# Patient Record
Sex: MALE | Race: BLACK OR AFRICAN AMERICAN | NOT HISPANIC OR LATINO | Employment: UNEMPLOYED | URBAN - METROPOLITAN AREA
[De-identification: names, ages, dates, MRNs, and addresses within clinical notes are randomized per-mention and may not be internally consistent; named-entity substitution may affect disease eponyms.]

---

## 2023-04-18 ENCOUNTER — APPOINTMENT (OUTPATIENT)
Dept: RADIOLOGY | Facility: MEDICAL CENTER | Age: 48
End: 2023-04-18
Attending: EMERGENCY MEDICINE
Payer: OTHER GOVERNMENT

## 2023-04-18 ENCOUNTER — HOSPITAL ENCOUNTER (EMERGENCY)
Facility: MEDICAL CENTER | Age: 48
End: 2023-04-18
Attending: EMERGENCY MEDICINE
Payer: OTHER GOVERNMENT

## 2023-04-18 VITALS
WEIGHT: 202.6 LBS | BODY MASS INDEX: 26 KG/M2 | TEMPERATURE: 98.6 F | HEART RATE: 61 BPM | HEIGHT: 74 IN | RESPIRATION RATE: 16 BRPM | SYSTOLIC BLOOD PRESSURE: 148 MMHG | DIASTOLIC BLOOD PRESSURE: 87 MMHG | OXYGEN SATURATION: 95 %

## 2023-04-18 DIAGNOSIS — R42 DIZZINESS: ICD-10-CM

## 2023-04-18 DIAGNOSIS — I10 HYPERTENSION, UNSPECIFIED TYPE: ICD-10-CM

## 2023-04-18 LAB
ALBUMIN SERPL BCP-MCNC: 4.2 G/DL (ref 3.2–4.9)
ALBUMIN/GLOB SERPL: 1.3 G/DL
ALP SERPL-CCNC: 86 U/L (ref 30–99)
ALT SERPL-CCNC: 26 U/L (ref 2–50)
ANION GAP SERPL CALC-SCNC: 12 MMOL/L (ref 7–16)
AST SERPL-CCNC: 58 U/L (ref 12–45)
BASOPHILS # BLD AUTO: 1.3 % (ref 0–1.8)
BASOPHILS # BLD: 0.05 K/UL (ref 0–0.12)
BILIRUB SERPL-MCNC: 0.6 MG/DL (ref 0.1–1.5)
BUN SERPL-MCNC: 14 MG/DL (ref 8–22)
CALCIUM ALBUM COR SERPL-MCNC: 8.8 MG/DL (ref 8.5–10.5)
CALCIUM SERPL-MCNC: 9 MG/DL (ref 8.5–10.5)
CHLORIDE SERPL-SCNC: 105 MMOL/L (ref 96–112)
CO2 SERPL-SCNC: 23 MMOL/L (ref 20–33)
CREAT SERPL-MCNC: 1.01 MG/DL (ref 0.5–1.4)
EKG IMPRESSION: NORMAL
EKG IMPRESSION: NORMAL
EOSINOPHIL # BLD AUTO: 0.16 K/UL (ref 0–0.51)
EOSINOPHIL NFR BLD: 4.3 % (ref 0–6.9)
ERYTHROCYTE [DISTWIDTH] IN BLOOD BY AUTOMATED COUNT: 40.2 FL (ref 35.9–50)
GFR SERPLBLD CREATININE-BSD FMLA CKD-EPI: 93 ML/MIN/1.73 M 2
GLOBULIN SER CALC-MCNC: 3.2 G/DL (ref 1.9–3.5)
GLUCOSE SERPL-MCNC: 105 MG/DL (ref 65–99)
HCT VFR BLD AUTO: 41 % (ref 42–52)
HGB BLD-MCNC: 13.8 G/DL (ref 14–18)
IMM GRANULOCYTES # BLD AUTO: 0 K/UL (ref 0–0.11)
IMM GRANULOCYTES NFR BLD AUTO: 0 % (ref 0–0.9)
LYMPHOCYTES # BLD AUTO: 1.51 K/UL (ref 1–4.8)
LYMPHOCYTES NFR BLD: 40.6 % (ref 22–41)
MCH RBC QN AUTO: 29.6 PG (ref 27–33)
MCHC RBC AUTO-ENTMCNC: 33.7 G/DL (ref 33.7–35.3)
MCV RBC AUTO: 88 FL (ref 81.4–97.8)
MONOCYTES # BLD AUTO: 0.42 K/UL (ref 0–0.85)
MONOCYTES NFR BLD AUTO: 11.3 % (ref 0–13.4)
NEUTROPHILS # BLD AUTO: 1.58 K/UL (ref 1.82–7.42)
NEUTROPHILS NFR BLD: 42.5 % (ref 44–72)
NRBC # BLD AUTO: 0 K/UL
NRBC BLD-RTO: 0 /100 WBC
PLATELET # BLD AUTO: 233 K/UL (ref 164–446)
PMV BLD AUTO: 9.6 FL (ref 9–12.9)
POTASSIUM SERPL-SCNC: 3.4 MMOL/L (ref 3.6–5.5)
PROT SERPL-MCNC: 7.4 G/DL (ref 6–8.2)
RBC # BLD AUTO: 4.66 M/UL (ref 4.7–6.1)
SODIUM SERPL-SCNC: 140 MMOL/L (ref 135–145)
TROPONIN T SERPL-MCNC: 8 NG/L (ref 6–19)
WBC # BLD AUTO: 3.7 K/UL (ref 4.8–10.8)

## 2023-04-18 PROCEDURE — 84484 ASSAY OF TROPONIN QUANT: CPT

## 2023-04-18 PROCEDURE — 71045 X-RAY EXAM CHEST 1 VIEW: CPT

## 2023-04-18 PROCEDURE — 70450 CT HEAD/BRAIN W/O DYE: CPT

## 2023-04-18 PROCEDURE — 93005 ELECTROCARDIOGRAM TRACING: CPT | Performed by: EMERGENCY MEDICINE

## 2023-04-18 PROCEDURE — 36415 COLL VENOUS BLD VENIPUNCTURE: CPT

## 2023-04-18 PROCEDURE — 99284 EMERGENCY DEPT VISIT MOD MDM: CPT

## 2023-04-18 PROCEDURE — 80053 COMPREHEN METABOLIC PANEL: CPT

## 2023-04-18 PROCEDURE — 85025 COMPLETE CBC W/AUTO DIFF WBC: CPT

## 2023-04-18 RX ORDER — LISINOPRIL 10 MG/1
10 TABLET ORAL DAILY
Qty: 30 TABLET | Refills: 0 | Status: SHIPPED | OUTPATIENT
Start: 2023-04-18 | End: 2023-05-07 | Stop reason: SDUPTHER

## 2023-04-18 ASSESSMENT — PAIN DESCRIPTION - PAIN TYPE: TYPE: ACUTE PAIN

## 2023-04-18 NOTE — ED NOTES
Pt provided with discharge teaching and information was discussed. Pt questions answered. Pt verbalized understanding of importance of follow up with health care provider. Pt verbalized importance to  prescription medication from agreed pharmacy. Pt verbalized understanding of when to return if symptoms worsen. Pt ambulated out of the ED.

## 2023-04-18 NOTE — ED PROVIDER NOTES
ED Provider Note    CHIEF COMPLAINT  Chief Complaint   Patient presents with    Dizziness     Patient reports dizzy spells for months. Patient states he has wanted to get it checked out for a while. Hx of htn. Denies any current medication for htn.        EXTERNAL RECORDS REVIEWED  Inpatient Notes discharge summary from January 16, 2003 reviewed and Outpatient Notes cardiology follow-up October 31, 2013    HPI/ROS  LIMITATION TO HISTORY   Select: : None  OUTSIDE HISTORIAN(S):  None    Kojo Amy is a 48 y.o. male who presents to the emergency department with known history of hypertension with longstanding history of intermittent dizzy spells.  Past medical history is significant for polysubstance abuse as was identified on chart review from 2003.  He states that he has been out of incarceration since 2015.  He has not been on antihypertensives for at least a few years.  Again as noted from triage the patient continues to endorse intermittent lightheaded dizziness for months.  Today he recalls having a recurrent bout of lightheaded dizziness while shopping at the local grocery store.  Currently asymptomatic    PAST MEDICAL HISTORY       SURGICAL HISTORY  patient denies any surgical history    FAMILY HISTORY  Family History   Problem Relation Age of Onset    Other Mother     Other Maternal Grandmother        SOCIAL HISTORY  Social History     Tobacco Use    Smoking status: Former    Smokeless tobacco: Never   Substance and Sexual Activity    Alcohol use: Yes     Comment: liquor during poker tonight    Drug use: Yes     Types: Oral, Inhaled     Comment: marijuana    Sexual activity: Not on file       CURRENT MEDICATIONS  Home Medications       Reviewed by Sharon Garcia R.N. (Registered Nurse) on 04/18/23 at 0017  Med List Status: Partial     Medication Last Dose Status        Patient Steven Taking any Medications                           ALLERGIES  No Known Allergies    PHYSICAL EXAM  VITAL SIGNS: BP  "(!) 148/87   Pulse 61   Temp 37 °C (98.6 °F) (Temporal)   Resp 16   Ht 1.88 m (6' 2\")   Wt 91.9 kg (202 lb 9.6 oz)   SpO2 95%   BMI 26.01 kg/m²      Pulse ox interpretation: I interpret this pulse ox as normal.  Constitutional: Alert in no apparent distress.  HENT: No signs of trauma, Bilateral external ears normal, Nose normal.   Eyes: Pupils are equal and reactive  Neck: Normal range of motion, No tenderness, Supple  Cardiovascular: Regular rate and rhythm, no murmurs.   Thorax & Lungs: Normal breath sounds, No respiratory distress, No wheezing, No chest tenderness.   Abdomen: Bowel sounds normal, Soft, No tenderness  Skin: Warm, Dry, No erythema, No rash.   Extremities: Intact distal pulses  Musculoskeletal: Good range of motion in all major joints. No tenderness to palpation or major deformities noted.   Neurologic: Alert , Normal motor function, Normal sensory function, No focal deficits noted.   Psychiatric: Affect normal, Judgment normal, Mood normal.         DIAGNOSTIC STUDIES / PROCEDURES    LABS  Results for orders placed or performed during the hospital encounter of 04/18/23   CBC w/ Differential   Result Value Ref Range    WBC 3.7 (L) 4.8 - 10.8 K/uL    RBC 4.66 (L) 4.70 - 6.10 M/uL    Hemoglobin 13.8 (L) 14.0 - 18.0 g/dL    Hematocrit 41.0 (L) 42.0 - 52.0 %    MCV 88.0 81.4 - 97.8 fL    MCH 29.6 27.0 - 33.0 pg    MCHC 33.7 33.7 - 35.3 g/dL    RDW 40.2 35.9 - 50.0 fL    Platelet Count 233 164 - 446 K/uL    MPV 9.6 9.0 - 12.9 fL    Neutrophils-Polys 42.50 (L) 44.00 - 72.00 %    Lymphocytes 40.60 22.00 - 41.00 %    Monocytes 11.30 0.00 - 13.40 %    Eosinophils 4.30 0.00 - 6.90 %    Basophils 1.30 0.00 - 1.80 %    Immature Granulocytes 0.00 0.00 - 0.90 %    Nucleated RBC 0.00 /100 WBC    Neutrophils (Absolute) 1.58 (L) 1.82 - 7.42 K/uL    Lymphs (Absolute) 1.51 1.00 - 4.80 K/uL    Monos (Absolute) 0.42 0.00 - 0.85 K/uL    Eos (Absolute) 0.16 0.00 - 0.51 K/uL    Baso (Absolute) 0.05 0.00 - 0.12 K/uL "    Immature Granulocytes (abs) 0.00 0.00 - 0.11 K/uL    NRBC (Absolute) 0.00 K/uL   Complete Metabolic Panel (CMP)   Result Value Ref Range    Sodium 140 135 - 145 mmol/L    Potassium 3.4 (L) 3.6 - 5.5 mmol/L    Chloride 105 96 - 112 mmol/L    Co2 23 20 - 33 mmol/L    Anion Gap 12.0 7.0 - 16.0    Glucose 105 (H) 65 - 99 mg/dL    Bun 14 8 - 22 mg/dL    Creatinine 1.01 0.50 - 1.40 mg/dL    Calcium 9.0 8.5 - 10.5 mg/dL    AST(SGOT) 58 (H) 12 - 45 U/L    ALT(SGPT) 26 2 - 50 U/L    Alkaline Phosphatase 86 30 - 99 U/L    Total Bilirubin 0.6 0.1 - 1.5 mg/dL    Albumin 4.2 3.2 - 4.9 g/dL    Total Protein 7.4 6.0 - 8.2 g/dL    Globulin 3.2 1.9 - 3.5 g/dL    A-G Ratio 1.3 g/dL   Troponin - STAT Once   Result Value Ref Range    Troponin T 8 6 - 19 ng/L   ESTIMATED GFR   Result Value Ref Range    GFR (CKD-EPI) 93 >60 mL/min/1.73 m 2   CORRECTED CALCIUM   Result Value Ref Range    Correct Calcium 8.8 8.5 - 10.5 mg/dL   EKG (NOW)   Result Value Ref Range    Report       [facility] Emergency Dept.    Test Date:  2023  Pt Name:    ALPHA NINETY-TWO             Department: ER  MRN:        [id number]                      Room:  Gender:     M                            Technician: [id number]  :        1975                   Requested By:ER TRIAGE PROTOCOL  Order #:    [id number]                    Reading MD:    Measurements  Intervals                                Axis  Rate:       58                           P:          61  ID:         [id number]                          QRS:        38  QRSD:       89                           T:          28  QT:         440  QTc:        433    Interpretive Statements  Sinus bradycardia  ST elev, probable normal early repol pattern  No previous ECG available for comparison     EKG   Result Value Ref Range    Report       [facility] Emergency Dept.    Test Date:  2023  Pt Name:    CLAY ORTIZ                 Department: ER  MRN:        [id number]                       Room:       [facility]  Gender:     M                            Technician: [id number]  :        1975                   Requested By:DONTRELL MARTINEZ  Order #:    [id number]                    Reading MD:    Measurements  Intervals                                Axis  Rate:       85                           P:          64  [place]:         167                          QRS:        47  QRSD:       85                           T:          30  QT:         383  QTc:        456    Interpretive Statements  Sinus rhythm  ST elev, probable normal early repol pattern  No previous ECG available for comparison           RADIOLOGY  I have independently interpreted the diagnostic imaging associated with this visit and am waiting the final reading from the radiologist.   My preliminary interpretation is as follows: CXR: NAD  Radiologist interpretation:   CT-HEAD W/O   Final Result         1.  No acute intracranial abnormality.         DX-CHEST-PORTABLE (1 VIEW)   Final Result         1.  No acute cardiopulmonary disease.            COURSE & MEDICAL DECISION MAKING    ED Observation Status? No; Patient does not meet criteria for ED Observation.     INITIAL ASSESSMENT, COURSE AND PLAN  Care Narrative: 48-year-old male presented emerged department with hypertension and dizziness.  Longstanding history of the same.  No new features.  At this point he explains that due to the persistence and ability to come to the ER at this point he elected to get further care and work-up.  Does not have local PCP.    We will work-up from a hypertensive urgency/emergency standpoint.      DISPOSITION AND DISCUSSIONS  I have discussed management of the patient with the following physicians and OMID's: None    Discussion of management with other QHP or appropriate source(s): Pharmacy for medication verification      Escalation of care considered, and ultimately not performed:acute inpatient care management, however at this time, the patient is most  appropriate for outpatient management    Barriers to care at this time, including but not limited to: Patient does not have established PCP.     Decision tools and prescription drugs considered including, but not limited to: NIH Stroke Scale 0 .    48-year-old male presenting to the emergency department with the above presentation.  Work-up is reassuring.  Head CT and chest x-ray are negative.  NIH is 0.  Heart score is low.  Will discharge home with outpatient follow-up with referral to new PCP.  Furthermore I will start him on a 30-day supply of lisinopril for ongoing antihypertensive therapy as he does have a known history of longstanding hypertension.  Additionally he is understanding of a quality diet to follow.  He is understanding that he should stay well-hydrated.  He will try to decrease his marijuana use.  Will return to the ER with any changes or worsening.      FINAL DIAGNOSIS  1. Hypertension, unspecified type    2. Dizziness           Electronically signed by: Raman Hassan M.D., 4/18/2023 1:44 AM

## 2023-04-18 NOTE — ED TRIAGE NOTES
Chief Complaint   Patient presents with    Dizziness     Patient reports dizzy spells for months. Patient states he has wanted to get it checked out for a while. Hx of htn. Denies any current medication for htn.       Patient very vague in triage. A&Ox4, speaking in full sentences. Patient educated on triage process and encouraged to notify staff if condition worsens. EKG obtained.

## 2023-04-18 NOTE — ED NOTES
Called lab and asked to add on blood work to current samples in lab. Patient ambulatory to bathroom with steady gait.

## 2023-05-07 ENCOUNTER — HOSPITAL ENCOUNTER (EMERGENCY)
Facility: MEDICAL CENTER | Age: 48
End: 2023-05-07
Attending: EMERGENCY MEDICINE
Payer: MEDICAID

## 2023-05-07 VITALS
TEMPERATURE: 98 F | WEIGHT: 198.85 LBS | HEIGHT: 74 IN | BODY MASS INDEX: 25.52 KG/M2 | RESPIRATION RATE: 20 BRPM | HEART RATE: 66 BPM | SYSTOLIC BLOOD PRESSURE: 211 MMHG | OXYGEN SATURATION: 98 % | DIASTOLIC BLOOD PRESSURE: 98 MMHG

## 2023-05-07 DIAGNOSIS — R51.9 NONINTRACTABLE EPISODIC HEADACHE, UNSPECIFIED HEADACHE TYPE: ICD-10-CM

## 2023-05-07 DIAGNOSIS — I15.9 SECONDARY HYPERTENSION: ICD-10-CM

## 2023-05-07 DIAGNOSIS — I10 HYPERTENSION, UNSPECIFIED TYPE: ICD-10-CM

## 2023-05-07 LAB
ALBUMIN SERPL BCP-MCNC: 4.1 G/DL (ref 3.2–4.9)
ALBUMIN/GLOB SERPL: 1.5 G/DL
ALP SERPL-CCNC: 80 U/L (ref 30–99)
ALT SERPL-CCNC: 18 U/L (ref 2–50)
ANION GAP SERPL CALC-SCNC: 12 MMOL/L (ref 7–16)
AST SERPL-CCNC: 41 U/L (ref 12–45)
BILIRUB SERPL-MCNC: 0.6 MG/DL (ref 0.1–1.5)
BUN SERPL-MCNC: 16 MG/DL (ref 8–22)
CALCIUM ALBUM COR SERPL-MCNC: 8.9 MG/DL (ref 8.5–10.5)
CALCIUM SERPL-MCNC: 9 MG/DL (ref 8.5–10.5)
CHLORIDE SERPL-SCNC: 105 MMOL/L (ref 96–112)
CO2 SERPL-SCNC: 22 MMOL/L (ref 20–33)
CREAT SERPL-MCNC: 1.04 MG/DL (ref 0.5–1.4)
ERYTHROCYTE [DISTWIDTH] IN BLOOD BY AUTOMATED COUNT: 41.3 FL (ref 35.9–50)
GFR SERPLBLD CREATININE-BSD FMLA CKD-EPI: 90 ML/MIN/1.73 M 2
GLOBULIN SER CALC-MCNC: 2.7 G/DL (ref 1.9–3.5)
GLUCOSE SERPL-MCNC: 97 MG/DL (ref 65–99)
HCT VFR BLD AUTO: 41.9 % (ref 42–52)
HGB BLD-MCNC: 13.8 G/DL (ref 14–18)
MCH RBC QN AUTO: 29.4 PG (ref 27–33)
MCHC RBC AUTO-ENTMCNC: 32.9 G/DL (ref 33.7–35.3)
MCV RBC AUTO: 89.1 FL (ref 81.4–97.8)
PLATELET # BLD AUTO: 232 K/UL (ref 164–446)
PMV BLD AUTO: 9.5 FL (ref 9–12.9)
POTASSIUM SERPL-SCNC: 3.9 MMOL/L (ref 3.6–5.5)
PROT SERPL-MCNC: 6.8 G/DL (ref 6–8.2)
RBC # BLD AUTO: 4.7 M/UL (ref 4.7–6.1)
SODIUM SERPL-SCNC: 139 MMOL/L (ref 135–145)
TROPONIN T SERPL-MCNC: 7 NG/L (ref 6–19)
WBC # BLD AUTO: 3.6 K/UL (ref 4.8–10.8)

## 2023-05-07 PROCEDURE — 85027 COMPLETE CBC AUTOMATED: CPT

## 2023-05-07 PROCEDURE — A9270 NON-COVERED ITEM OR SERVICE: HCPCS | Mod: UD | Performed by: EMERGENCY MEDICINE

## 2023-05-07 PROCEDURE — 84484 ASSAY OF TROPONIN QUANT: CPT

## 2023-05-07 PROCEDURE — 80053 COMPREHEN METABOLIC PANEL: CPT

## 2023-05-07 PROCEDURE — 36415 COLL VENOUS BLD VENIPUNCTURE: CPT

## 2023-05-07 PROCEDURE — 99283 EMERGENCY DEPT VISIT LOW MDM: CPT

## 2023-05-07 PROCEDURE — 700102 HCHG RX REV CODE 250 W/ 637 OVERRIDE(OP): Mod: UD | Performed by: EMERGENCY MEDICINE

## 2023-05-07 RX ORDER — LISINOPRIL 20 MG/1
20 TABLET ORAL DAILY
Qty: 90 TABLET | Refills: 0 | Status: SHIPPED | OUTPATIENT
Start: 2023-05-07 | End: 2023-05-24 | Stop reason: SDUPTHER

## 2023-05-07 RX ORDER — HYDROCHLOROTHIAZIDE 25 MG/1
25 TABLET ORAL ONCE
Status: COMPLETED | OUTPATIENT
Start: 2023-05-07 | End: 2023-05-07

## 2023-05-07 RX ORDER — HYDRALAZINE HYDROCHLORIDE 10 MG/1
10 TABLET, FILM COATED ORAL ONCE
Status: COMPLETED | OUTPATIENT
Start: 2023-05-07 | End: 2023-05-07

## 2023-05-07 RX ORDER — HYDROCHLOROTHIAZIDE 25 MG/1
25 TABLET ORAL DAILY
Qty: 90 TABLET | Refills: 0 | Status: SHIPPED | OUTPATIENT
Start: 2023-05-07 | End: 2023-08-05

## 2023-05-07 RX ORDER — LISINOPRIL 10 MG/1
10 TABLET ORAL ONCE
Status: COMPLETED | OUTPATIENT
Start: 2023-05-07 | End: 2023-05-07

## 2023-05-07 RX ADMIN — HYDRALAZINE HYDROCHLORIDE 10 MG: 10 TABLET, FILM COATED ORAL at 21:29

## 2023-05-07 RX ADMIN — HYDROCHLOROTHIAZIDE 25 MG: 25 TABLET ORAL at 20:38

## 2023-05-07 RX ADMIN — LISINOPRIL 10 MG: 10 TABLET ORAL at 20:38

## 2023-05-07 ASSESSMENT — FIBROSIS 4 INDEX: FIB4 SCORE: 2.25

## 2023-05-08 NOTE — ED TRIAGE NOTES
"Francisco Pittman Jr.  Chief Complaint   Patient presents with    Headache     Associated with HTN, periodic     Blood Pressure Problem     Pt was seen here less than a month ago for the same, he started taking antihypertensive medications and says they haven't been working.      Denies chest pain or SOB    Pt placed in lobby and educated on triage process. Pt encouraged to alert staff for any changes.     Patient and staff wearing appropriate PPE    BP (!) 182/111 Comment: left arm  Pulse 68   Temp 36.9 °C (98.5 °F) (Temporal)   Resp 18   Ht 1.88 m (6' 2\")   Wt 90.2 kg (198 lb 13.7 oz)   SpO2 98%   BMI 25.53 kg/m²     "

## 2023-05-08 NOTE — ED NOTES
Pt provided discharge instructions, and prescription. Pt verbalized understanding of all instructions. IV removed, cathlon intact, site without s/s of infection. Pt ambulatory to lobby w/ steady gait.

## 2023-05-08 NOTE — ED PROVIDER NOTES
"ED Provider Note    CHIEF COMPLAINT  Chief Complaint   Patient presents with    Headache     Associated with HTN, periodic     Blood Pressure Problem     Pt was seen here less than a month ago for the same, he started taking antihypertensive medications and says they haven't been working.      EXTERNAL RECORDS REVIEWED  Inpatient Notes discharge summary from January 16, 2003 reviewed and Outpatient Notes cardiology follow-up October 31, 2013.  Her visit from 4/18/2023 when the patient was seen for dizziness in the setting of hypertension.  At time labs were unremarkable, no acute bleed on head CT, normal chest x-ray.  Was started on lisinopril.    HPI/ROS  LIMITATION TO HISTORY   Select: : None  OUTSIDE HISTORIAN(S):  none    Francisco Pittman Jr. is a 48 y.o. male who presents urgency room for ongoing treatment of his hypertension.  Patient states that he has been diagnosed with hypertension since being in intermediate several years ago when he initially was put on a medication that ended in \"DRIL\" and said that he had no response to this medication until he had something that sounded like \"HCT.\"  Reports he had significant improvement in his control when he was discharged eventually from intermediate.  He has been lost to follow-up for some time and was eventually seen back at the ER approximately a month ago when he was seen at that time for having some intermittent headaches and uncontrolled blood pressure.  After a negative work-up and CT scan he was started on lisinopril and says he has been taking this but says he gets some intermittent headaches, wants a week that abates with aspirin and Tylenol and says his blood pressure states been taking it been persistently elevated like before.  He is requesting to be back on any other medications and is wishing to establish care with a primary care doctor.    Unaware of any longstanding cardiac, liver or kidney problems.    PAST MEDICAL HISTORY   Hypertension    SURGICAL " "HISTORY  patient denies any surgical history    FAMILY HISTORY  Family History   Problem Relation Age of Onset    Other Mother     Other Maternal Grandmother      SOCIAL HISTORY  Social History     Tobacco Use    Smoking status: Former    Smokeless tobacco: Never   Substance and Sexual Activity    Alcohol use: Yes     Comment: occ    Drug use: Not Currently     Types: Oral, Inhaled     Comment: marijuana    Sexual activity: Not on file     CURRENT MEDICATIONS  Home Medications    **Home medications have not yet been reviewed for this encounter**       ALLERGIES  No Known Allergies    PHYSICAL EXAM  VITAL SIGNS: BP (!) 211/98   Pulse 66   Temp 36.7 °C (98 °F) (Temporal)   Resp 20   Ht 1.88 m (6' 2\")   Wt 90.2 kg (198 lb 13.7 oz)   SpO2 98%   BMI 25.53 kg/m²    Genl: F sitting in chair comfortably, speaking clearly, appears in no acute distress  Head: NC/AT   ENT: Mucous membranes moist, posterior pharynx clear, uvula midline, nares patent bilaterally   Eyes: Normal sclera, pupils equal round reactive to light  Neck: Supple, FROM, no LAD appreciated   Pulmonary: Lungs are clear to auscultation bilaterally  Chest: No TTP  CV:  RRR, no murmur appreciated, pulses 2+ in both upper and lower extremities,  Abdomen: soft, NT/ND; no rebound/guarding, no masses palpated, no HSM   Musculoskeletal: Pain free ROM of the neck. Moving upper and lower extremities and spontaneous in coordinated fashion  Neuro: Mental Status: Speech fluent without errors. Follows all commands. No dysarthria or apraxia.  Cranial Nerves: Pupils equal round and reactive to light. Extraocular motion intact. Visual fields intact. No nystagmus. CN V1-V3 intact to light touch. No facial asymmetry. Hearing clinically intact bilaterally. Tongue protrusion midline. No uvular deviation. Normal shoulder shrug and head turn.  Motor:  RUE: 5/5 with hand , 5/5 with flexion at the elbow 5/5 with extension at the elbow  LUE: 5/5 with hand , 5/5 with " flexion at the elbow 5/5 with extension at the elbow  RLE: 5/5 with leg raise, 5/5 with plantar flexion, 5/5 with dorsal flexion  LLE: 5/5 with leg raise, 5/5 with plantar flexion, 5/5 with dorsal flexion  Sensation to light touch intact throughout  Reflexes 2+ Patellar tendons  No ataxia noted  Psych: Patient has an appropriate affect and behavior  Skin: No rash or lesions.  No pallor or jaundice.  No cyanosis.  Warm and dry.     DIAGNOSTIC STUDIES / PROCEDURES  LABS  Labs Reviewed   CBC WITHOUT DIFFERENTIAL - Abnormal; Notable for the following components:       Result Value    WBC 3.6 (*)     Hemoglobin 13.8 (*)     Hematocrit 41.9 (*)     MCHC 32.9 (*)     All other components within normal limits    Narrative:     Biotin intake of greater than 5 mg per day may interfere with  troponin levels, causing false low values.   COMP METABOLIC PANEL    Narrative:     Biotin intake of greater than 5 mg per day may interfere with  troponin levels, causing false low values.   TROPONIN    Narrative:     Biotin intake of greater than 5 mg per day may interfere with  troponin levels, causing false low values.   CORRECTED CALCIUM    Narrative:     Biotin intake of greater than 5 mg per day may interfere with  troponin levels, causing false low values.   ESTIMATED GFR    Narrative:     Biotin intake of greater than 5 mg per day may interfere with  troponin levels, causing false low values.     COURSE & MEDICAL DECISION MAKING    ED Observation Status? No; Patient does not meet criteria for ED Observation.     INITIAL ASSESSMENT, COURSE AND PLAN  Care Narrative: Patient seen and evaluated for the symptoms as described above.  The patient has had difficult to control blood pressure for prolonged period of time but says he had appropriate management with dual therapy in the past.  He was seen and evaluated in the ER for very similar complaints and at that time had no evidence of chronic microvascular changes or prior strokes and  did not have end organ dysfunction.  At the time of presentation he has been having some intermittent headaches over the course of several weeks and says that his single dose of amlodipine has not been helpful and controlling his blood pressure.  He does arrive with elevated diastolic over 110, systolic of 200 and says he is a very similar chronic pressures for him.  He has no febrile illness, no focal neurological deficits or signs of increased intracranial pressure.  He is not actively having a headache at this time.  I did repeat lab work to make sure there was no interval changes in his creatinine, troponin elevation though he has no other findings of ischemic cardiac disease and would point towards making longstanding hypertension is causing issues.  He has no visual changes, no headaches, no other peripheral signs of focal neurological deficits.  I do not see evidence of needing to obtain a repeat head CT and both oral antihypertensives that he is taking in the past were added.  He has several validated to his blood pressure with fluctuations down to 180/90 with no other perceptive changes at that time.  This went back up to approximately 205/105 after ambulation.  He is treated with additional dose of his lisinopril and was started on hydrochlorothiazide.  He had received a single oral dose of hydralazine here in the emergency department to bridge to oral medications.  During my repeat assessment he was 198/92 and remains symptom-free with no tachycardia or other evolving changes.  I have placed a urgent referral for follow-up with the family medicine clinic, he is going to keep a daily single time blood pressure log going forward and will need to return to the emergency department if despite taking these medications he is having continued headaches, or if he has any weakness, syncope, chest pain.  Questions are addressed and he is discharged home in stable condition.    DISPOSITION AND DISCUSSIONS  I have  discussed management of the patient with the following physicians and OMID's:  none    Discussion of management with other QHP or appropriate source(s): None     Escalation of care considered, and ultimately not performed:diagnostic imaging    Barriers to care at this time, including but not limited to: Patient does not have established PCP.     Decision tools and prescription drugs considered including, but not limited to: Medication modification Lisinopril to 20mg daily, added 25 hctz daily .    FINAL DIAGNOSIS  1. Secondary hypertension    2. Nonintractable episodic headache, unspecified headache type    3. Hypertension, unspecified type         Electronically signed by: Pramod Encinas M.D., 5/7/2023 8:09 PM

## 2023-05-19 ENCOUNTER — APPOINTMENT (OUTPATIENT)
Dept: INTERNAL MEDICINE | Facility: OTHER | Age: 48
End: 2023-05-19
Attending: EMERGENCY MEDICINE
Payer: MEDICAID

## 2023-05-24 ENCOUNTER — OFFICE VISIT (OUTPATIENT)
Dept: INTERNAL MEDICINE | Facility: OTHER | Age: 48
End: 2023-05-24
Payer: MEDICAID

## 2023-05-24 VITALS
DIASTOLIC BLOOD PRESSURE: 93 MMHG | OXYGEN SATURATION: 97 % | WEIGHT: 196 LBS | SYSTOLIC BLOOD PRESSURE: 166 MMHG | HEIGHT: 73 IN | BODY MASS INDEX: 25.98 KG/M2 | TEMPERATURE: 97.8 F | HEART RATE: 75 BPM

## 2023-05-24 DIAGNOSIS — I10 HYPERTENSION, UNSPECIFIED TYPE: ICD-10-CM

## 2023-05-24 DIAGNOSIS — Z00.00 HEALTHCARE MAINTENANCE: ICD-10-CM

## 2023-05-24 DIAGNOSIS — D64.9 ANEMIA, UNSPECIFIED TYPE: ICD-10-CM

## 2023-05-24 DIAGNOSIS — Z23 ENCOUNTER FOR VACCINATION: ICD-10-CM

## 2023-05-24 PROCEDURE — 90715 TDAP VACCINE 7 YRS/> IM: CPT

## 2023-05-24 PROCEDURE — 90471 IMMUNIZATION ADMIN: CPT

## 2023-05-24 PROCEDURE — 99204 OFFICE O/P NEW MOD 45 MIN: CPT | Mod: 25,GC

## 2023-05-24 PROCEDURE — 3077F SYST BP >= 140 MM HG: CPT | Mod: GC

## 2023-05-24 PROCEDURE — 3080F DIAST BP >= 90 MM HG: CPT | Mod: GC

## 2023-05-24 RX ORDER — WEIGH SCALE
1 MISCELLANEOUS MISCELLANEOUS ONCE
Qty: 1 EACH | Refills: 0 | Status: SHIPPED | OUTPATIENT
Start: 2023-05-24 | End: 2023-05-24

## 2023-05-24 RX ORDER — LISINOPRIL 40 MG/1
40 TABLET ORAL DAILY
Qty: 90 TABLET | Refills: 1 | Status: SHIPPED | OUTPATIENT
Start: 2023-05-24

## 2023-05-24 RX ORDER — LISINOPRIL 40 MG/1
40 TABLET ORAL DAILY
Qty: 90 TABLET | Refills: 1 | Status: SHIPPED | OUTPATIENT
Start: 2023-05-24 | End: 2023-05-24 | Stop reason: CLARIF

## 2023-05-24 RX ORDER — CALCIUM POLYCARBOPHIL 625 MG 625 MG/1
625 TABLET ORAL DAILY
Qty: 90 TABLET | Refills: 1 | Status: SHIPPED | OUTPATIENT
Start: 2023-05-24

## 2023-05-24 ASSESSMENT — ENCOUNTER SYMPTOMS
CARDIOVASCULAR NEGATIVE: 1
CHILLS: 0
NAUSEA: 0
NEUROLOGICAL NEGATIVE: 1
VOMITING: 0
NECK PAIN: 0
MYALGIAS: 1
FALLS: 0
CONSTIPATION: 1
RESPIRATORY NEGATIVE: 1
ABDOMINAL PAIN: 0
FEVER: 0
BACK PAIN: 0
BLURRED VISION: 0
CONSTITUTIONAL NEGATIVE: 1
EYES NEGATIVE: 1
HEADACHES: 0

## 2023-05-24 ASSESSMENT — FIBROSIS 4 INDEX: FIB4 SCORE: 1.92

## 2023-05-24 ASSESSMENT — PATIENT HEALTH QUESTIONNAIRE - PHQ9: CLINICAL INTERPRETATION OF PHQ2 SCORE: 0

## 2023-05-24 NOTE — PROGRESS NOTES
New Patient  CC: Establish Care with Primary Care Physician     Chief Complaint   Patient presents with    Hypertension Follow-up       HPI:    Francisco Pittman is a 48 y.o. male who presents today requesting management of blood pressures    Patient reports he does not have a blood pressure cuff at home.  Patient notes that he has previously required multiple medications to provide blood pressure control.    Patient also reporting left shoulder pain overlying the lateral deltoid over the last 6 months, comes and goes, sharp pain. Not provoked with exercise or use of his left arm, occasionally associated with numbness.  No limited range of motion nor is there any pain with specific maneuvers when he is rotating his arm.    Family history  Grandmother, mother on moms side high blood pressure.  Grandmother on mom's side DM. 50s/60s  Mom cancer - stomach? or pancreatic? cancer. 46/47    No allergies    Maybe 0.5 pack years total    Alcohol 1-2 times per week. 3-4 standard drinks per session    Marijuana stopped smoking 4/20/23; Remote cocaine use    Living with girlfriend, motivated by children to preserve his longevity to provide for them. Obtaining commercial nupur license.    ROS:     Review of Systems   Constitutional: Negative.  Negative for chills and fever.   HENT: Negative.     Eyes: Negative.  Negative for blurred vision.   Respiratory: Negative.     Cardiovascular: Negative.  Negative for chest pain.   Gastrointestinal:  Positive for constipation. Negative for abdominal pain, nausea and vomiting.   Genitourinary: Negative.    Musculoskeletal:  Positive for myalgias. Negative for back pain, falls, joint pain and neck pain.   Skin: Negative.    Neurological: Negative.  Negative for headaches.   Endo/Heme/Allergies: Negative.        No past medical history on file.    There are no problems to display for this patient.      No past surgical history on file.    Family History   Problem Relation Age of  "Onset    Other Mother     Other Maternal Grandmother        Social History     Tobacco Use    Smoking status: Never    Smokeless tobacco: Never   Substance Use Topics    Alcohol use: Yes     Alcohol/week: 1.2 oz     Types: 2 Shots of liquor per week     Comment: occ    Drug use: Not Currently     Types: Oral, Inhaled     Comment: marijuana       Current Outpatient Medications   Medication Sig Dispense Refill    hydroCHLOROthiazide (HYDRODIURIL) 25 MG Tab Take 1 Tablet by mouth every day for 90 days. 90 Tablet 0    lisinopril (PRINIVIL) 20 MG Tab Take 1 Tablet by mouth every day for 90 days. 90 Tablet 0     No current facility-administered medications for this visit.       Allergies:    Patient has no known allergies.    Physical Exam:    BP (!) 166/93 (BP Location: Right arm, Patient Position: Sitting, BP Cuff Size: Adult long)   Pulse 75   Temp 36.6 °C (97.8 °F) (Temporal)   Ht 1.854 m (6' 1\")   Wt 88.9 kg (196 lb)   SpO2 97%   BMI 25.86 kg/m²     General: Well-developed, well-nourished male in no distress  HEENT: NC/AT  Eyes: Conjuntiva without any obvious injection or erythema.   Mouth: mucous membranes moist, no erythema  Neck: Supple, non-tender with no thyromegaly or adenopathy.  Lungs: Clear to auscultation bilaterally with good respiratory effort.  No wheezes, crackles or rhonci are heard.  Heart: Normal rate, regular rhythm with no murmurs, rubs or gallops heard.  Extremites: No cyanosis/clubbing/edema. No obvious deformities.  Skin: Warm and dry.  No visible rashes.  Neuro: Alert & oriented, grossly non-focal  Psych: Patient is awake, alert and oriented with recent and remote memory intact. Affect normal, mood normal, judgment normal.    Assessment and Plan:     Essential hypertension  Patient without blood pressure log but history of difficult to control blood pressure. Patient is motivated to monitor blood pressure.  - prescription provided for blood pressure cuff  - continue HCTZ 25 mg daily  - " increase lisinopril to 40 mg daily  - maintain blood pressure log    2. Left deltoid pain  Non-typical presentation for rotator cuff injury nore muscle strain/spasm. At this time, causes pain but does not inhibit functioning day to day.   - patient instructed to monitor for triggers of shoulder pain  - control as needed with OTC analgesia, tylenol preferred.    3. Constipation  Patient reports some straining with bowel movements on ROS, but continues to have daily bowel movements  - begin fibercon fiber supplement daily    4. Healthcare maintenance  Cologaurd  Pt refusing covid vaccine  Hep c screening done while in prison, negative  Patient follow up on hep b vaccination    All imaging results and lab results and consult notes are reviewed at this visit.    Signed by: Markell Nina M.D.

## 2023-05-24 NOTE — PATIENT INSTRUCTIONS
We would like you to make an appointment with your pharmacy so that you may get the hepatitis B vaccine at your earliest convenience.    Please follow-up obtaining information on your family history to determine which type of cancer runs in your family.    We would like you to keep a regular blood pressure log.  Please take your blood pressure every morning and keep this in an organized chart.  Please bring this chart with you to your next appointment.  We have included handouts on how to take your blood pressure as well as how to keep blood pressures organized.